# Patient Record
Sex: FEMALE | Race: WHITE | URBAN - METROPOLITAN AREA
[De-identification: names, ages, dates, MRNs, and addresses within clinical notes are randomized per-mention and may not be internally consistent; named-entity substitution may affect disease eponyms.]

---

## 2017-01-18 ENCOUNTER — OFFICE VISIT (OUTPATIENT)
Dept: PAIN MANAGEMENT | Age: 47
End: 2017-01-18

## 2017-01-18 VITALS
SYSTOLIC BLOOD PRESSURE: 154 MMHG | BODY MASS INDEX: 32.44 KG/M2 | WEIGHT: 201 LBS | DIASTOLIC BLOOD PRESSURE: 96 MMHG | HEART RATE: 97 BPM

## 2017-01-18 DIAGNOSIS — M76.891 ENTHESOPATHY OF HIP REGION ON BOTH SIDES: ICD-10-CM

## 2017-01-18 DIAGNOSIS — G89.4 CHRONIC PAIN SYNDROME: ICD-10-CM

## 2017-01-18 DIAGNOSIS — G43.719 INTRACTABLE CHRONIC MIGRAINE WITHOUT AURA AND WITHOUT STATUS MIGRAINOSUS: ICD-10-CM

## 2017-01-18 DIAGNOSIS — M76.892 ENTHESOPATHY OF HIP REGION ON BOTH SIDES: ICD-10-CM

## 2017-01-18 DIAGNOSIS — F41.9 ANXIETY AND DEPRESSION: ICD-10-CM

## 2017-01-18 DIAGNOSIS — K80.50 PANCREATITIS DUE TO COMMON BILE DUCT STONE: ICD-10-CM

## 2017-01-18 DIAGNOSIS — Z79.899 ENCOUNTER FOR LONG-TERM (CURRENT) USE OF HIGH-RISK MEDICATION: ICD-10-CM

## 2017-01-18 DIAGNOSIS — K85.90 PANCREATITIS DUE TO COMMON BILE DUCT STONE: ICD-10-CM

## 2017-01-18 DIAGNOSIS — D86.2 SARCOIDOSIS OF LUNG WITH SARCOIDOSIS OF LYMPH NODES (HCC): ICD-10-CM

## 2017-01-18 DIAGNOSIS — M79.18 MYOFASCIAL PAIN: ICD-10-CM

## 2017-01-18 DIAGNOSIS — M46.1 SACROILIITIS, NOT ELSEWHERE CLASSIFIED (HCC): ICD-10-CM

## 2017-01-18 DIAGNOSIS — M25.50 PAIN IN JOINT, MULTIPLE SITES: ICD-10-CM

## 2017-01-18 DIAGNOSIS — M54.59 MECHANICAL LOW BACK PAIN: Primary | ICD-10-CM

## 2017-01-18 DIAGNOSIS — R10.84 GENERALIZED ABDOMINAL PAIN: ICD-10-CM

## 2017-01-18 DIAGNOSIS — F32.A ANXIETY AND DEPRESSION: ICD-10-CM

## 2017-01-18 RX ORDER — METHYLPHENIDATE HYDROCHLORIDE 10 MG/1
TABLET ORAL
Qty: 90 TAB | Refills: 0 | Status: SHIPPED | OUTPATIENT
Start: 2017-02-26 | End: 2017-04-24 | Stop reason: SDUPTHER

## 2017-01-18 RX ORDER — OXYCODONE HYDROCHLORIDE 30 MG/1
30 TABLET ORAL
Qty: 120 TAB | Refills: 0 | Status: SHIPPED | OUTPATIENT
Start: 2017-02-26 | End: 2017-03-28

## 2017-01-18 RX ORDER — ROPINIROLE 0.5 MG/1
1 TABLET, FILM COATED ORAL 2 TIMES DAILY
Qty: 120 TAB | Refills: 11 | Status: SHIPPED | OUTPATIENT
Start: 2017-01-18 | End: 2017-10-02 | Stop reason: SDUPTHER

## 2017-01-18 RX ORDER — FROVATRIPTAN SUCCINATE 2.5 MG/1
TABLET, FILM COATED ORAL
Qty: 12 TAB | Refills: 11 | Status: SHIPPED | OUTPATIENT
Start: 2017-01-18

## 2017-01-18 RX ORDER — METHYLPHENIDATE HYDROCHLORIDE 10 MG/1
TABLET ORAL
Qty: 90 TAB | Refills: 0 | Status: SHIPPED | OUTPATIENT
Start: 2017-03-27

## 2017-01-18 RX ORDER — HYDROMORPHONE HYDROCHLORIDE 3 MG/1
3 SUPPOSITORY RECTAL
Qty: 12 SUPPOSITORY | Refills: 0 | Status: SHIPPED | OUTPATIENT
Start: 2017-02-16 | End: 2017-03-18

## 2017-01-18 RX ORDER — HYDROMORPHONE HYDROCHLORIDE 3 MG/1
3 SUPPOSITORY RECTAL
Qty: 12 SUPPOSITORY | Refills: 0 | Status: SHIPPED | OUTPATIENT
Start: 2017-03-17 | End: 2017-04-16

## 2017-01-18 RX ORDER — OXYCODONE HYDROCHLORIDE 30 MG/1
30 TABLET ORAL
Qty: 120 TAB | Refills: 0 | Status: SHIPPED | OUTPATIENT
Start: 2017-01-28 | End: 2017-02-27

## 2017-01-18 RX ORDER — OXYCODONE HYDROCHLORIDE 30 MG/1
30 TABLET ORAL
Qty: 120 TAB | Refills: 0 | Status: SHIPPED | OUTPATIENT
Start: 2017-03-27 | End: 2017-04-27 | Stop reason: SDUPTHER

## 2017-01-18 RX ORDER — HYDROMORPHONE HYDROCHLORIDE 3 MG/1
3 SUPPOSITORY RECTAL
Qty: 12 SUPPOSITORY | Refills: 0 | Status: SHIPPED | OUTPATIENT
Start: 2017-04-15 | End: 2017-05-15

## 2017-01-18 RX ORDER — METHYLPHENIDATE HYDROCHLORIDE 10 MG/1
TABLET ORAL
Qty: 90 TAB | Refills: 0 | Status: SHIPPED | OUTPATIENT
Start: 2017-01-28

## 2017-01-18 RX ORDER — DICYCLOMINE HYDROCHLORIDE 10 MG/1
10 CAPSULE ORAL
COMMUNITY

## 2017-01-18 NOTE — MR AVS SNAPSHOT
Visit Information Date & Time Provider Department Dept. Phone Encounter #  
 1/18/2017  3:40 PM Katelin Valdez MD [facility] [phone] Follow-up Instructions Return in about 3 months (around 4/18/2017). Upcoming Health Maintenance Date Due DTaP/Tdap/Td series (1 - Tdap) 3/13/1990 PAP AKA CERVICAL CYTOLOGY 3/30/2015 INFLUENZA AGE 9 TO ADULT 8/1/2016 Allergies as of 1/18/2017  Review Complete On: 1/18/2017 By: Nate Peraza RN Severity Noted Reaction Type Reactions Latex High 08/19/2013    Anaphylaxis Ambien [Zolpidem]   Intolerance Other (comments) Works too long/ not allergic Augmentin [Amoxicillin-pot Clavulanate]    Diarrhea NOT ALLERGIC Metformin    Other (comments) GI upset/ NOT ALLERGIC Zoloft [Sertraline]    Other (comments) Insomnia/ NOT ALLERGIC Current Immunizations  Never Reviewed No immunizations on file. Not reviewed this visit You Were Diagnosed With   
  
 Codes Comments Anxiety and depression     ICD-10-CM: F41.9, F32.9 ICD-9-CM: 300.00, 311 Vitals BP Pulse Weight(growth percentile) LMP BMI OB Status (!) 154/96 97 201 lb (91.2 kg) 12/19/2016 32.44 kg/m2 Having regular periods Smoking Status Never Smoker BMI and BSA Data Body Mass Index Body Surface Area  
 32.44 kg/m 2 2.06 m 2 Preferred Pharmacy Pharmacy Name Phone [facility] [address] [phone] Your Updated Medication List  
  
   
This list is accurate as of: 1/18/17  5:01 PM.  Always use your most recent med list.  
  
  
  
  
 budesonide-formoterol 160-4.5 mcg/actuation HFA inhaler Commonly known as:  SYMBICORT Take 2 Puffs by inhalation two (2) times a day. CREON 12,000-38,000 -60,000 unit capsule Generic drug:  amylase-lipase-protease Take  by mouth three (3) times daily (with meals). cyclobenzaprine 10 mg tablet Commonly known as:  FLEXERIL  
TAKE ONE TABLET BY MOUTH THREE TIMES DAILY WITH MEALS  
  
 dicyclomine 10 mg capsule Commonly known as:  BENTYL Take 10 mg by mouth 4 times daily (before meals and nightly). ergocalciferol (vitamin d2) 50,000 unit Tab Take  by mouth.  
  
 escitalopram oxalate 20 mg tablet Commonly known as:  Patricia Ramp TAKE ONE TABLET BY MOUTH ONE TIME DAILY  
  
 fluticasone 50 mcg/actuation nasal spray Commonly known as:  FLONASE  
1 spray in each nostril bid  
  
 frovatriptan 2.5 mg tablet Commonly known as:  Duy Camacho TAKE 1 TABLET BY MOUTH BEGINNING 5 DAYS PRIOR TO ONSET OF MENSES. STOP ON DAY 1, RESUME DAILY DOSE ON DAYS 5-10  Indications: MIGRAINE  
  
 * HYDROmorphone 3 mg suppository Commonly known as:  DILAUDID Insert 3 mg into rectum every six (6) hours as needed for Pain for up to 30 days. Max Daily Amount: 12 mg. Intractable migraine  Indications: PAIN Start taking on:  2/16/2017  
  
 * HYDROmorphone 3 mg suppository Commonly known as:  DILAUDID Insert 3 mg into rectum every six (6) hours as needed for Pain for up to 30 days. Max Daily Amount: 12 mg. Intractable migraine  Indications: PAIN Start taking on:  3/17/2017  
  
 * HYDROmorphone 3 mg suppository Commonly known as:  DILAUDID Insert 3 mg into rectum every six (6) hours as needed for Pain for up to 30 days. Max Daily Amount: 12 mg. Intractable migraine  Indications: PAIN Start taking on:  4/15/2017  
  
 methIMAzole 5 mg tablet Commonly known as:  TAPAZOLE Take  by mouth. * methylphenidate 10 mg tablet Commonly known as:  RITALIN For alertness--Take 1-2 in the am and 1 after lunchtime if needed. Indications: ATTENTION-DEFICIT HYPERACTIVITY DISORDER Start taking on:  1/28/2017 * methylphenidate 10 mg tablet Commonly known as:  RITALIN  
 For alertness--Take 1-2 in the am and 1 after lunchtime if needed. Indications: ATTENTION-DEFICIT HYPERACTIVITY DISORDER Start taking on:  2/26/2017 * methylphenidate 10 mg tablet Commonly known as:  RITALIN For alertness--Take 1-2 in the am and 1 after lunchtime if needed. Indications: ATTENTION-DEFICIT HYPERACTIVITY DISORDER Start taking on:  3/27/2017  
  
 mometasone 220 mcg (14 doses) inhaler Commonly known as:  Elenor Freddy Take  by inhalation daily. montelukast 10 mg tablet Commonly known as:  SINGULAIR Take 10 mg by mouth daily. naproxen  mg EC tablet Commonly known as:  NAPROSYN EC  
2 po to treat migraine headache Max 2/day  Indications: DYSMENORRHEA, PAIN  
  
 ondansetron 8 mg disintegrating tablet Commonly known as:  ZOFRAN ODT  
DISSOLVE ONE TABLET IN MOUTH AT ONSET OF MIGRAINE AND UP TO EVERY SIX HOURS AS NEEDED FOR NAUSEA  
  
 ONE-A-DAY WOMENS FORMULA PO Take  by mouth. * oxyCODONE IR 30 mg immediate release tablet Commonly known as:  Berny Nathalie Take 1 Tab by mouth four (4) times daily as needed for Pain for up to 30 days. Indications: NEUROPATHIC PAIN, PAIN Start taking on:  1/28/2017 * oxyCODONE IR 30 mg immediate release tablet Commonly known as:  Berny Nathalie Take 1 Tab by mouth four (4) times daily as needed for Pain for up to 30 days. Indications: NEUROPATHIC PAIN, PAIN Start taking on:  2/26/2017 * oxyCODONE IR 30 mg immediate release tablet Commonly known as:  OXY-IR Take 1 Tab by mouth four (4) times daily as needed for Pain for up to 30 days. Indications: NEUROPATHIC PAIN, PAIN Start taking on:  3/27/2017 * oxyMORphone 20 mg ER tablet Commonly known as:  OPANA ER Take 1 Tab by mouth every twelve (12) hours for 30 days. Max Daily Amount: 40 mg. Indications: CHRONIC PAIN, SEVERE PAIN Start taking on:  1/28/2017 * oxyMORphone 20 mg ER tablet Commonly known as:  OPANA ER  
 Take 1 Tab by mouth every twelve (12) hours for 30 days. Max Daily Amount: 40 mg. Indications: CHRONIC PAIN, SEVERE PAIN Start taking on:  2/26/2017 * oxyMORphone 20 mg ER tablet Commonly known as:  OPANA ER Take 1 Tab by mouth every twelve (12) hours for 30 days. Max Daily Amount: 40 mg. Indications: CHRONIC PAIN, SEVERE PAIN Start taking on:  3/27/2017 PriLOSEC 20 mg capsule Generic drug:  omeprazole Take 20 mg by mouth daily. * rOPINIRole 0.5 mg tablet Commonly known as:  Sapphire Schimke Take 2 Tabs by mouth two (2) times a day. * rOPINIRole 0.5 mg tablet Commonly known as:  Sapphire Schimke Take 2 Tabs by mouth two (2) times a day for 30 days. Indications: Restless Legs Syndrome SUMAtriptan 20 mg/actuation nasal spray Commonly known as:  IMITREX  
1 spray as directed for intractable headache. Do not use within 6 hours of frova SUMAtriptan Succinate 6 mg/0.5 mL Nfij Commonly known as:  Marcela Domingo Use prn severe headaches  Indications: MIGRAINE  
  
 topiramate 100 mg tablet Commonly known as:  TOPAMAX TAKE ONE TABLET BY MOUTH TWICE DAILY * Notice: This list has 14 medication(s) that are the same as other medications prescribed for you. Read the directions carefully, and ask your doctor or other care provider to review them with you. Prescriptions Printed Refills  
 methylphenidate (RITALIN) 10 mg tablet 0 Starting on: 3/27/2017 Sig: For alertness--Take 1-2 in the am and 1 after lunchtime if needed. Indications: ATTENTION-DEFICIT HYPERACTIVITY DISORDER Class: Print  
 oxyMORphone 20 mg PO ER tablet 0 Starting on: 3/27/2017 Sig: Take 1 Tab by mouth every twelve (12) hours for 30 days. Max Daily Amount: 40 mg. Indications: CHRONIC PAIN, SEVERE PAIN Class: Print Route: Oral  
 HYDROmorphone (DILAUDID) 3 mg suppository 0 Starting on: 4/15/2017  Sig: Insert 3 mg into rectum every six (6) hours as needed for Pain for up to 30 days. Max Daily Amount: 12 mg. Intractable migraine  Indications: PAIN Class: Print Route: Rectal  
 oxyCODONE IR (OXY-IR) 30 mg immediate release tablet 0 Starting on: 3/27/2017 Sig: Take 1 Tab by mouth four (4) times daily as needed for Pain for up to 30 days. Indications: NEUROPATHIC PAIN, PAIN Class: Print Route: Oral  
 oxyCODONE IR (ROXICODONE) 30 mg immediate release tablet 0 Starting on: 2/26/2017 Sig: Take 1 Tab by mouth four (4) times daily as needed for Pain for up to 30 days. Indications: NEUROPATHIC PAIN, PAIN Class: Print Route: Oral  
 oxyMORphone 20 mg PO ER tablet 0 Starting on: 2/26/2017 Sig: Take 1 Tab by mouth every twelve (12) hours for 30 days. Max Daily Amount: 40 mg. Indications: CHRONIC PAIN, SEVERE PAIN Class: Print Route: Oral  
 oxyMORphone 20 mg PO ER tablet 0 Starting on: 1/28/2017 Sig: Take 1 Tab by mouth every twelve (12) hours for 30 days. Max Daily Amount: 40 mg. Indications: CHRONIC PAIN, SEVERE PAIN Class: Print Route: Oral  
 HYDROmorphone (DILAUDID) 3 mg suppository 0 Starting on: 3/17/2017 Sig: Insert 3 mg into rectum every six (6) hours as needed for Pain for up to 30 days. Max Daily Amount: 12 mg. Intractable migraine  Indications: PAIN Class: Print Route: Rectal  
 HYDROmorphone (DILAUDID) 3 mg suppository 0 Starting on: 2/16/2017 Sig: Insert 3 mg into rectum every six (6) hours as needed for Pain for up to 30 days. Max Daily Amount: 12 mg. Intractable migraine  Indications: PAIN Class: Print Route: Rectal  
 oxyCODONE IR (ROXICODONE) 30 mg immediate release tablet 0 Starting on: 1/28/2017 Sig: Take 1 Tab by mouth four (4) times daily as needed for Pain for up to 30 days. Indications: NEUROPATHIC PAIN, PAIN Class: Print Route: Oral  
 methylphenidate (RITALIN) 10 mg tablet 0 Starting on: 2/26/2017 Sig: For alertness--Take 1-2 in the am and 1 after lunchtime if needed. Indications: ATTENTION-DEFICIT HYPERACTIVITY DISORDER Class: Print  
 methylphenidate (RITALIN) 10 mg tablet 0 Starting on: 1/28/2017 Sig: For alertness--Take 1-2 in the am and 1 after lunchtime if needed. Indications: ATTENTION-DEFICIT HYPERACTIVITY DISORDER Class: Print Prescriptions Sent to Pharmacy Refills  
 frovatriptan (FROVA) 2.5 mg tablet 11 Sig: TAKE 1 TABLET BY MOUTH BEGINNING 5 DAYS PRIOR TO ONSET OF MENSES. STOP ON DAY 1, RESUME DAILY DOSE ON DAYS 5-10  Indications: MIGRAINE Class: Normal  
 Pharmacy: [place] [address] [address] [facility] Ph #: [phone] rOPINIRole (REQUIP) 0.5 mg tablet 11 Sig: Take 2 Tabs by mouth two (2) times a day for 30 days. Indications: Restless Legs Syndrome Class: Normal  
 Pharmacy: [place] [address] Ph #: [phone] Route: Oral  
  
Follow-up Instructions Return in about 3 months (around 4/18/2017). Patient Instructions Current health maintenance issues were reviewed and the patient was advised to followup with his/her PCP for completion of these items. Introducing [facility]! [employer] introduces [employer] patient portal. Now you can access parts of your medical record, email your doctor's office, and request medication refills online. 1. In your internet browser, go to [email or web address]"[employer]. [place]/"[employer] 2. Click on the First Time User? Click Here link in the Sign In box. You will see the New Member Sign Up page. 3. Enter your [employer] Access Code exactly as it appears below. You will not need to use this code after youve completed the sign-up process. If you do not sign up before the expiration date, you must request a new code. · [employer] Access Code: [email or web address] Expires: 1/24/2017  3:42 PM 
 
4.  Enter the last four digits of your Social Security Number (xxxx) and Date of Birth (mm/dd/yyyy) as indicated and click Submit. You will be taken to the next sign-up page. 5. Create a [employer] ID. This will be your [employer] login ID and cannot be changed, so think of one that is secure and easy to remember. 6. Create a [employer] password. You can change your password at any time. 7. Enter your Password Reset Question and Answer. This can be used at a later time if you forget your password. 8. Enter your e-mail address. You will receive e-mail notification when new information is available in [address] 9. Click Sign Up. You can now view and download portions of your medical record. 10. Click the Download Summary menu link to download a portable copy of your medical information. If you have questions, please visit the Frequently Asked Questions section of the [employer] website. Remember, [employer] is NOT to be used for urgent needs. For medical emergencies, dial 911. Now available from your iPhone and Android! Please provide this summary of care documentation to your next provider. Your primary care clinician is listed as Pee Gutierrez. If you have any questions after today's visit, please call [phone].

## 2017-01-18 NOTE — PROGRESS NOTES
Nursing Notes    Patient presents to the office today in follow-up. Patient rates her pain at 5/10 on the numerical pain scale. Reviewed medications with counts as follows:    Rx Date filled Qty Dispensed Pill Count Last Dose Short   maynor 30 1/10/17 120 89 1/18/17 no   Oxymorphone Er 20 12/30/16 60 25 1/18/17 no   Ritalin 10 12/30/16 30 60 1/18/17 no       Comments:     POC UDS was not performed in office today    Any new labs or imaging since last appointment? YES, chest xrays and blood work with cultures    Have you been to an emergency room (ER) or urgent care clinic since your last visit? NO            Have you been hospitalized since your last visit? NO     If yes, where, when, and reason for visit? Have you seen or consulted any other health care providers outside of the [address] [place] [address]  since your last visit? YES   pulmonologist with pneumonia  If yes, where, when, and reason for visit? Ms. Cameron Brunner has a reminder for a \"due or due soon\" health maintenance. I have asked that she contact her primary care provider for follow-up on this health maintenance.

## 2017-01-19 NOTE — PROGRESS NOTES
HISTORY OF PRESENT ILLNESS  Bruno Pierson is a 51 y.o. female. HPI she returns for follow-up of chronic, severe pain which is widespread and multifactorial and includes chronic intractable migraine headaches, fibromyalgia, generalized osteoarthritis, mechanical low back pain, and sacroiliitis as well as generalized abdominal pain. Pain has been under good control, averaging 5 out of 10 with 80% overall relief. She reports only 3 bad headaches in the past 3 months. Pain level today 5 out of 10, outcome 11/28,(The lower the upper number, the better the outcome)  Physical activity mobility, mood, and sleep are all fair. No reported side effects. Review of Systems   Constitutional: Positive for weight loss. HENT: Positive for tinnitus. Eyes: Positive for photophobia. Wearing eyeglasses   Gastrointestinal: Positive for abdominal pain, constipation (uses stool softeners), diarrhea, heartburn, nausea and vomiting. Musculoskeletal: Positive for back pain, joint pain, myalgias and neck pain. Neurological: Positive for headaches. Endo/Heme/Allergies: Bruises/bleeds easily. All other systems reviewed and are negative. Physical Exam   Constitutional: She is oriented to person, place, and time. She appears well-developed and well-nourished. No distress. Overweight pleasant female in NAD   HENT:   Head: Normocephalic and atraumatic. Eyes: EOM are normal.   Neck: No thyromegaly present. Musculoskeletal:        Right shoulder: She exhibits tenderness. Left shoulder: She exhibits tenderness. Right hip: She exhibits tenderness. Left hip: She exhibits tenderness. Cervical back: She exhibits tenderness, pain and spasm. Lumbar back: She exhibits decreased range of motion, tenderness, pain and spasm. Right forearm: She exhibits tenderness. Left forearm: She exhibits tenderness. Right lower leg: She exhibits tenderness.         Left lower leg: She exhibits tenderness. Tightness, pain and spasm of cervical paraspinal muscles, extending upwards to the occiput and caudally, involving trapezial and scapular muscles bilaterally. Pain, tenderness and spasm with palpation of lumbar paraspinal muscles, and bilateral SIJs/trochanters. Neurological: She is alert and oriented to person, place, and time. Psychiatric: She has a normal mood and affect. Her behavior is normal. Judgment and thought content normal.   Nursing note and vitals reviewed. ASSESSMENT and PLAN  Encounter Diagnoses   Name Primary? 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 3 month reassess her    No concerns are raised for misuse, abuse, or diversion. 1. Pain medications are prescribed with the objective of pain relief and improved physical and psychosocial function in this patient. 2. Counseled patient on proper use of prescribed medications and reviewed opioid contract. 3. Counseled patient about chronic medical conditions and their relationship to anxiety and depression and recommended mental health support as needed. 4. Reviewed with patient self-help tools, home exercise, and lifestyle changes to assist the patient in self-management of symptoms.   5. Advised patient to have a primary care provider to continue care for health maintenance and general medical conditions and support for referral to specialty care as needed. 6. Reviewed with patient the treatment plan, goals of treatment plan, and limitations of treatment plan, to include the potential for side effects from medications and procedures. If side effects occur, it is the responsibility of the patient to inform the clinic so that a change in the treatment plan can be made in a safe manner. The patient is advised that stopping prescribed medication may cause an increase in symptoms and possible medication withdrawal symptoms. The patient is informed an emergency room evaluation may be necessary if this occurs. DISPOSITION: The patients condition and plan were discussed at length and all questions were answered. The patient agrees with the plan.     Counseling occupied > 50% of visit:  Total time: 40 minutes

## 2017-04-24 DIAGNOSIS — F32.A ANXIETY AND DEPRESSION: ICD-10-CM

## 2017-04-24 DIAGNOSIS — F41.9 ANXIETY AND DEPRESSION: ICD-10-CM

## 2017-04-24 RX ORDER — METHYLPHENIDATE HYDROCHLORIDE 10 MG/1
TABLET ORAL
Qty: 90 TAB | Refills: 0 | Status: SHIPPED | OUTPATIENT
Start: 2017-05-04

## 2017-04-24 NOTE — TELEPHONE ENCOUNTER
Patients appointment was rescheduled due to the provider out of office. Has rescheduled to 5/8/17. Requesting enough medications.       reviewed and noted last filled oxymorphone er 4/6/17, hydromorphone suppositories 4/12 and ritalin 4/6/17

## 2017-04-27 RX ORDER — OXYCODONE HYDROCHLORIDE 30 MG/1
30 TABLET ORAL
Qty: 120 TAB | Refills: 0 | Status: SHIPPED | OUTPATIENT
Start: 2017-05-05 | End: 2017-06-04

## 2017-05-08 NOTE — TELEPHONE ENCOUNTER
Pt late for her rescheduled appt and had to be rescheduled. Will need oxymorphone to last as last her rescheduled appt.  Pt advised via message of script readiness

## 2017-05-10 ENCOUNTER — TELEPHONE (OUTPATIENT)
Dept: PAIN MANAGEMENT | Age: 47
End: 2017-05-10

## 2017-05-10 NOTE — TELEPHONE ENCOUNTER
Submitted pa to [facility] for oxymorphone er 30mg - was denied - pt disenrolled as of 4/30/17. Called pt to see if she has new insurance. Pt said she is waiting for paperwork for [employer], so right now she does not have insurance. Pt asked if there is something cheaper she could get as she has to pay out of pocket for her medication and the opana er is very expensive. Told pt I will let Janice know - as she is the one who would have to make the decision as to what she can prescribe. Pt has about 5 days of meds left - she paid for 8 days of opana er. Pt understood.

## 2017-05-31 NOTE — TELEPHONE ENCOUNTER
Deborah Suarez PA-C   to Corey Persaud LPN           1:21 PM    Pt will have to be seen for any med changes. Please see if we have a spot open.  Thanks /kg

## 2017-05-31 NOTE — TELEPHONE ENCOUNTER
Received call from patient stating that she now has coverage and is requesting medications; please advise.

## 2017-06-27 ENCOUNTER — OFFICE VISIT (OUTPATIENT)
Dept: PAIN MANAGEMENT | Age: 47
End: 2017-06-27

## 2017-06-27 VITALS
DIASTOLIC BLOOD PRESSURE: 106 MMHG | HEART RATE: 114 BPM | WEIGHT: 201 LBS | BODY MASS INDEX: 32.44 KG/M2 | SYSTOLIC BLOOD PRESSURE: 151 MMHG

## 2017-06-27 DIAGNOSIS — G43.719 INTRACTABLE CHRONIC MIGRAINE WITHOUT AURA AND WITHOUT STATUS MIGRAINOSUS: Primary | ICD-10-CM

## 2017-06-27 DIAGNOSIS — Z79.899 ENCOUNTER FOR LONG-TERM (CURRENT) USE OF HIGH-RISK MEDICATION: ICD-10-CM

## 2017-06-27 DIAGNOSIS — M25.50 PAIN IN JOINT, MULTIPLE SITES: ICD-10-CM

## 2017-06-27 DIAGNOSIS — M46.1 SACROILIITIS, NOT ELSEWHERE CLASSIFIED (HCC): ICD-10-CM

## 2017-06-27 DIAGNOSIS — M54.59 MECHANICAL LOW BACK PAIN: ICD-10-CM

## 2017-06-27 RX ORDER — METHYLPHENIDATE HYDROCHLORIDE 10 MG/1
TABLET ORAL
Qty: 90 TAB | Refills: 0 | Status: SHIPPED | OUTPATIENT
Start: 2017-06-27

## 2017-06-27 RX ORDER — MORPHINE SULFATE 20 MG/1
CAPSULE, EXTENDED RELEASE ORAL
Qty: 50 CAP | Refills: 0 | Status: SHIPPED | OUTPATIENT
Start: 2017-06-27

## 2017-06-27 RX ORDER — METHYLPHENIDATE HYDROCHLORIDE 10 MG/1
TABLET ORAL
Qty: 90 TAB | Refills: 0 | Status: SHIPPED | OUTPATIENT
Start: 2017-07-25

## 2017-06-27 RX ORDER — MORPHINE SULFATE 20 MG/1
20 CAPSULE, EXTENDED RELEASE ORAL 2 TIMES DAILY
Qty: 60 CAP | Refills: 0 | Status: SHIPPED | OUTPATIENT
Start: 2017-07-25 | End: 2017-08-24

## 2017-06-27 NOTE — MR AVS SNAPSHOT
Visit Information Date & Time Provider Department Dept. Phone Encounter #  
 6/27/2017  3:20 PM Dony Saucedo MD [facility] [phone] [id number] Follow-up Instructions Return in about 2 months (around 8/27/2017). Upcoming Health Maintenance Date Due DTaP/Tdap/Td series (1 - Tdap) 3/13/1990 PAP AKA CERVICAL CYTOLOGY 3/30/2015 INFLUENZA AGE 9 TO ADULT 8/1/2017 Allergies as of 6/27/2017  Review Complete On: 6/27/2017 By: Aury Mobley RN Severity Noted Reaction Type Reactions Latex High 08/19/2013    Anaphylaxis Ambien [Zolpidem]   Intolerance Other (comments) Works too long/ not allergic Augmentin [Amoxicillin-pot Clavulanate]    Diarrhea NOT ALLERGIC Metformin    Other (comments) GI upset/ NOT ALLERGIC Zoloft [Sertraline]    Other (comments) Insomnia/ NOT ALLERGIC Current Immunizations  Never Reviewed No immunizations on file. Not reviewed this visit Vitals BP Pulse Weight(growth percentile) BMI OB Status Smoking Status (!) 151/106 (!) 114 201 lb (91.2 kg) 32.44 kg/m2 Having regular periods Never Smoker Vitals History BMI and BSA Data Body Mass Index Body Surface Area  
 32.44 kg/m 2 2.06 m 2 Preferred Pharmacy Pharmacy Name Phone [facility] [address] [phone] Your Updated Medication List  
  
   
This list is accurate as of: 6/27/17  4:29 PM.  Always use your most recent med list.  
  
  
  
  
 budesonide-formoterol 160-4.5 mcg/actuation HFA inhaler Commonly known as:  SYMBICORT Take 2 Puffs by inhalation two (2) times a day. CREON 12,000-38,000 -60,000 unit capsule Generic drug:  amylase-lipase-protease Take  by mouth three (3) times daily (with meals). cyclobenzaprine 10 mg tablet Commonly known as:  FLEXERIL  
 TAKE ONE TABLET BY MOUTH THREE TIMES DAILY WITH MEALS  
  
 dicyclomine 10 mg capsule Commonly known as:  BENTYL Take 10 mg by mouth 4 times daily (before meals and nightly). ergocalciferol (vitamin d2) 50,000 unit Tab Take  by mouth.  
  
 escitalopram oxalate 20 mg tablet Commonly known as:  Oletha Dubs TAKE ONE TABLET BY MOUTH ONE TIME DAILY  
  
 fluticasone 50 mcg/actuation nasal spray Commonly known as:  FLONASE  
1 spray in each nostril bid  
  
 frovatriptan 2.5 mg tablet Commonly known as:  Pecola Carol TAKE 1 TABLET BY MOUTH BEGINNING 5 DAYS PRIOR TO ONSET OF MENSES. STOP ON DAY 1, RESUME DAILY DOSE ON DAYS 5-10  Indications: MIGRAINE  
  
 methIMAzole 5 mg tablet Commonly known as:  TAPAZOLE Take  by mouth. * methylphenidate 10 mg tablet Commonly known as:  RITALIN For alertness--Take 1-2 in the am and 1 after lunchtime if needed. Indications: ATTENTION-DEFICIT HYPERACTIVITY DISORDER  
  
 * methylphenidate 10 mg tablet Commonly known as:  RITALIN For alertness--Take 1-2 in the am and 1 after lunchtime if needed. Indications: ATTENTION-DEFICIT HYPERACTIVITY DISORDER  
  
 * methylphenidate 10 mg tablet Commonly known as:  RITALIN For alertness--Take 1-2 in the am and 1 after lunchtime if needed. Indications: ATTENTION-DEFICIT HYPERACTIVITY DISORDER  
  
 * methylphenidate 10 mg tablet Commonly known as:  RITALIN Take 1-2 in the am and 1 after lunchtime if needed * methylphenidate 10 mg tablet Commonly known as:  RITALIN Take 1-2 in the am and 1 after lunchtime if needed Start taking on:  7/25/2017  
  
 mometasone 220 mcg (14 doses) inhaler Commonly known as:  Cheral Overall Take  by inhalation daily. montelukast 10 mg tablet Commonly known as:  SINGULAIR Take 10 mg by mouth daily. * Morphine 20 mg Cerp Commonly known as:  ED For chronic pain: 1 po q pm x 14 days then 1 po bid  Indications: Chronic Pain, Severe Pain * Morphine 20 mg Cerp Commonly known as:  ED Take 20 mg by mouth two (2) times a day for 30 days. Max Daily Amount: 40 mg. For chronic pain  Indications: Chronic Pain, Severe Pain Start taking on:  7/25/2017  
  
 naproxen  mg EC tablet Commonly known as:  NAPROSYN EC  
2 po to treat migraine headache Max 2/day  Indications: DYSMENORRHEA, PAIN  
  
 ondansetron 8 mg disintegrating tablet Commonly known as:  ZOFRAN ODT  
DISSOLVE ONE TABLET IN MOUTH AT ONSET OF MIGRAINE AND UP TO EVERY SIX HOURS AS NEEDED FOR NAUSEA  
  
 ONE-A-DAY WOMENS FORMULA PO Take  by mouth. PriLOSEC 20 mg capsule Generic drug:  omeprazole Take 20 mg by mouth daily. rOPINIRole 0.5 mg tablet Commonly known as:  Belita Leer Take 2 Tabs by mouth two (2) times a day. SUMAtriptan 20 mg/actuation nasal spray Commonly known as:  IMITREX  
1 spray as directed for intractable headache. Do not use within 6 hours of frova SUMAtriptan Succinate 6 mg/0.5 mL Nfij Commonly known as:  Paz Sample Use prn severe headaches  Indications: MIGRAINE  
  
 topiramate 100 mg tablet Commonly known as:  TOPAMAX TAKE ONE TABLET BY MOUTH TWICE DAILY * Notice: This list has 7 medication(s) that are the same as other medications prescribed for you. Read the directions carefully, and ask your doctor or other care provider to review them with you. Prescriptions Printed Refills Morphine (ED) 20 mg CERP 0 Starting on: 7/25/2017 Sig: Take 20 mg by mouth two (2) times a day for 30 days. Max Daily Amount: 40 mg. For chronic pain  Indications: Chronic Pain, Severe Pain Class: Print Route: Oral  
 Morphine (ED) 20 mg CERP 0 Sig: For chronic pain: 1 po q pm x 14 days then 1 po bid  Indications: Chronic Pain, Severe Pain Class: Print  
 methylphenidate (RITALIN) 10 mg tablet 0 Starting on: 7/25/2017 Sig: Take 1-2 in the am and 1 after lunchtime if needed Class: Print  
 methylphenidate (RITALIN) 10 mg tablet 0 Sig: Take 1-2 in the am and 1 after lunchtime if needed Class: Print Follow-up Instructions Return in about 2 months (around 8/27/2017). Patient Instructions Current health maintenance issues were reviewed and the patient was advised to followup with his/her PCP for completion of these items. Introducing \[facility] [place] [facility]\"" & HEALTH SERVICES! Dear Nellie Morales: Thank you for requesting a [facility] account. Our records indicate that you already have an active [facility] account. You can access your account anytime at [email or web address] Did you know that you can access your hospital and ER discharge instructions at any time in [employer]? You can also review all of your test results from your hospital stay or ER visit. Additional Information If you have questions, please visit the Frequently Asked Questions section of the [employer] website at [email or web address] Remember, [facility] is NOT to be used for urgent needs. For medical emergencies, dial 911. Now available from your iPhone and Android! Please provide this summary of care documentation to your next provider. Your primary care clinician is listed as Pee Gutierrez. If you have any questions after today's visit, please call [phone].

## 2017-06-27 NOTE — PROGRESS NOTES
Nursing Notes    Patient presents to the office today in follow-up. Patient rates her pain at 15/10 on the numerical pain scale. Comments: pt has been without meds since May, bridge script here dated to fill 5/8/17 but was not picked up. Script destroyed. POC UDS was performed in office today    Any new labs or imaging since last appointment? NO    Have you been to an emergency room (ER) or urgent care clinic since your last visit? NO            Have you been hospitalized since your last visit? NO     If yes, where, when, and reason for visit? Have you seen or consulted any other health care providers outside of the [facility]  since your last visit? NO     If yes, where, when, and reason for visit? Ms. Belgica Pool has a reminder for a \"due or due soon\" health maintenance. I have asked that she contact her primary care provider for follow-up on this health maintenance.

## 2017-06-28 LAB
ALCOHOL UR POC: NORMAL
AMPHETAMINES UR POC: NEGATIVE
BARBITURATES UR POC: NEGATIVE
BENZODIAZEPINES UR POC: NEGATIVE
BUPRENORPHINE UR POC: NORMAL
CANNABINOIDS UR POC: NEGATIVE
CARISOPRODOL UR POC: NORMAL
COCAINE UR POC: NEGATIVE
FENTANYL UR POC: NORMAL
MDMA/ECSTASY UR POC: NEGATIVE
METHADONE UR POC: NEGATIVE
METHAMPHETAMINE UR POC: NEGATIVE
METHYLPHENIDATE UR POC: NEGATIVE
OPIATES UR POC: NEGATIVE
OXYCODONE UR POC: NEGATIVE
PHENCYCLIDINE UR POC: NEGATIVE
PROPOXYPHENE UR POC: NORMAL
TRAMADOL UR POC: NORMAL
TRICYCLICS UR POC: NEGATIVE

## 2017-06-28 NOTE — PROGRESS NOTES
HISTORY OF PRESENT ILLNESS  Michelle Dee is a 49 y.o. female. HPI she returns for follow-up of chronic, severe pain which is widespread and multifactorial and includes chronic intractable migraine headaches, fibromyalgia, generalized osteoarthritis, mechanical low back pain, and sacroiliitis as well as generalized abdominal pain. As a result of scheduling difficulties, the patient had to be without her medications for approximately 1 month. She does not wish to return to the same level of medication as previously. Her medication history was reviewed and it was elected to initiate Mahnaz, 20 mg once daily which may be increased to twice daily after 2 weeks for long-acting pain control. Pain level today 5 out of 10, outcome 14/28,(The lower the upper number, the better the outcome)  Physical activity and mobility, mood, and sleep are all poor. No reported side effects. A current review of the  does not identify any inconsistency. UDS obtained and reviewed; formal confirmation from laboratory is pending. Review of Systems   Constitutional: Positive for weight loss. HENT: Positive for tinnitus. Eyes: Positive for photophobia. Wearing eyeglasses   Gastrointestinal: Positive for abdominal pain, constipation (uses stool softeners), diarrhea, heartburn, nausea and vomiting. Musculoskeletal: Positive for back pain, joint pain, myalgias and neck pain. Neurological: Positive for headaches. Endo/Heme/Allergies: Bruises/bleeds easily. All other systems reviewed and are negative. Physical Exam   Constitutional: She is oriented to person, place, and time. She appears well-developed and well-nourished. No distress. Overweight pleasant female in NAD   HENT:   Head: Normocephalic and atraumatic. Eyes: EOM are normal.   Neck: No thyromegaly present. Musculoskeletal:        Right shoulder: She exhibits tenderness. Left shoulder: She exhibits tenderness.         Right hip: She exhibits tenderness. Left hip: She exhibits tenderness. Cervical back: She exhibits tenderness, pain and spasm. Lumbar back: She exhibits decreased range of motion, tenderness, pain and spasm. Right forearm: She exhibits tenderness. Left forearm: She exhibits tenderness. Right lower leg: She exhibits tenderness. Left lower leg: She exhibits tenderness. Tightness, pain and spasm of cervical paraspinal muscles, extending upwards to the occiput and caudally, involving trapezial and scapular muscles bilaterally. Pain, tenderness and spasm with palpation of lumbar paraspinal muscles, and bilateral SIJs/trochanters. Neurological: She is alert and oriented to person, place, and time. Psychiatric: She has a normal mood and affect. Her behavior is normal. Judgment and thought content normal.   Nursing note and vitals reviewed. ASSESSMENT and PLAN  Encounter Diagnoses   Name Primary?  Intractable chronic migraine without aura and without status migrainosus Yes    Mechanical low back pain     Encounter for long-term (current) use of high-risk medication     Sacroiliitis, not elsewhere classified ([facility] [address]     Pain in joint, multiple sites      Treatment plan as noted above. 2 month reassess her    No concerns are raised for misuse, abuse, or diversion. 1. Pain medications are prescribed with the objective of pain relief and improved physical and psychosocial function in this patient. 2. Counseled patient on proper use of prescribed medications and reviewed opioid contract. 3. Counseled patient about chronic medical conditions and their relationship to anxiety and depression and recommended mental health support as needed. 4. Reviewed with patient self-help tools, home exercise, and lifestyle changes to assist the patient in self-management of symptoms.   5. Advised patient to have a primary care provider to continue care for health maintenance and general medical conditions and support for referral to specialty care as needed. 6. Reviewed with patient the treatment plan, goals of treatment plan, and limitations of treatment plan, to include the potential for side effects from medications and procedures. If side effects occur, it is the responsibility of the patient to inform the clinic so that a change in the treatment plan can be made in a safe manner. The patient is advised that stopping prescribed medication may cause an increase in symptoms and possible medication withdrawal symptoms. The patient is informed an emergency room evaluation may be necessary if this occurs. DISPOSITION: The patients condition and plan were discussed at length and all questions were answered. The patient agrees with the plan.     Counseling occupied > 50% of visit:  Total time: 40 minutes

## 2017-10-02 ENCOUNTER — OFFICE VISIT (OUTPATIENT)
Dept: PAIN MANAGEMENT | Age: 47
End: 2017-10-02

## 2017-10-02 VITALS
HEART RATE: 93 BPM | DIASTOLIC BLOOD PRESSURE: 102 MMHG | WEIGHT: 228 LBS | SYSTOLIC BLOOD PRESSURE: 168 MMHG | HEIGHT: 66 IN | BODY MASS INDEX: 36.64 KG/M2 | TEMPERATURE: 98.3 F | RESPIRATION RATE: 19 BRPM

## 2017-10-02 DIAGNOSIS — R41.3 MEMORY DIFFICULTY: ICD-10-CM

## 2017-10-02 DIAGNOSIS — Z79.899 ENCOUNTER FOR LONG-TERM (CURRENT) USE OF HIGH-RISK MEDICATION: ICD-10-CM

## 2017-10-02 DIAGNOSIS — G43.719 INTRACTABLE CHRONIC MIGRAINE WITHOUT AURA AND WITHOUT STATUS MIGRAINOSUS: Primary | ICD-10-CM

## 2017-10-02 DIAGNOSIS — F41.9 ANXIETY AND DEPRESSION: ICD-10-CM

## 2017-10-02 DIAGNOSIS — G43.019 INTRACTABLE MIGRAINE WITHOUT AURA AND WITHOUT STATUS MIGRAINOSUS: ICD-10-CM

## 2017-10-02 DIAGNOSIS — G43.839 INTRACTABLE MENSTRUAL MIGRAINE WITHOUT STATUS MIGRAINOSUS: ICD-10-CM

## 2017-10-02 DIAGNOSIS — M79.18 MYOFASCIAL PAIN: ICD-10-CM

## 2017-10-02 DIAGNOSIS — G89.4 CHRONIC PAIN SYNDROME: ICD-10-CM

## 2017-10-02 DIAGNOSIS — F32.A ANXIETY AND DEPRESSION: ICD-10-CM

## 2017-10-02 DIAGNOSIS — R10.84 GENERALIZED ABDOMINAL PAIN: ICD-10-CM

## 2017-10-02 RX ORDER — METHYLPHENIDATE HYDROCHLORIDE 10 MG/1
TABLET ORAL
Qty: 90 TAB | Refills: 0 | Status: SHIPPED | OUTPATIENT
Start: 2017-10-02

## 2017-10-02 RX ORDER — ROPINIROLE 0.5 MG/1
1 TABLET, FILM COATED ORAL
Qty: 60 TAB | Refills: 1 | Status: SHIPPED | OUTPATIENT
Start: 2017-10-02 | End: 2017-11-01

## 2017-10-02 RX ORDER — ONDANSETRON 8 MG/1
TABLET, ORALLY DISINTEGRATING ORAL
Qty: 60 TAB | Refills: 1 | Status: SHIPPED | OUTPATIENT
Start: 2017-10-02

## 2017-10-02 RX ORDER — METHYLPHENIDATE HYDROCHLORIDE 10 MG/1
TABLET ORAL
Qty: 90 TAB | Refills: 0 | Status: SHIPPED | OUTPATIENT
Start: 2017-11-01

## 2017-10-02 RX ORDER — CYCLOBENZAPRINE HCL 10 MG
10 TABLET ORAL
Qty: 90 TAB | Refills: 1 | Status: SHIPPED | OUTPATIENT
Start: 2017-10-02 | End: 2017-11-01

## 2017-10-02 NOTE — PROGRESS NOTES
Nursing Notes    Patient presents to the office today in follow-up. Patient rates her pain at 6/10 on the numerical pain scale. Reviewed medications with counts as follows:    Rx Date filled Qty Dispensed Pill Count Last Dose Short   Morphine sulfate ER 20 mg                                            POC UDS was not performed in office today    Any new labs or imaging since last appointment? NO    Have you been to an emergency room (ER) or urgent care clinic since your last visit? NO            Have you been hospitalized since your last visit? NO     If yes, where, when, and reason for visit? Have you seen or consulted any other health care providers outside of the [address] [place] [address]  since your last visit? NO     If yes, where, when, and reason for visit? HM deferred to pcp.

## 2017-10-02 NOTE — PROGRESS NOTES
HISTORY OF PRESENT ILLNESS  Shirley Moya is a 49 y.o. female    HPI: Ms. Zenon Alfaro returns for follow-up of chronic intractable migraine headaches, fibromyalgia, generalized abdominal pain (chronic pancreatitis) and generalized osteoarthritis. This is my first visit with this patient. She reports that she has been off her opioid medication Mahnaz 20 mg, for some time now.  indicates morphine ER 20 mg was last filled July 5, 2017. She states that she has been, \"in and out of withdrawals many times in the past \". She does not want to refill the Mahnaz 20 mg at this time. She reports that this medication does \"nothing for my pain\". Ms. Zenon Alfaro reports that she lost her insurance and will not have new insurance until January 1, 2018. She does work as a . We discussed going over possible new medications today however this patient reports she is late for a training class and does not have time to discuss any new medications today. She is not sure which medications she will be able to afford until then. I informed her that Dr. Marimar Plaza is no longer working as a provider in this pain management practice. We discussed that this change will involve adjustments to currently prescribed pain medications. This patient is reminded that she has the option to transfer to another pain management clinic if desired. We will provide a neurology referral today to continue migraine headache management if needed. A two month prescription bridge of her current migraine medication will be provided today for this patient in order to help her transition to a new provider for her migraine treatment. She understands and verbally agrees. She tolerates her medications without side effects. Shirley Moya reports no change in sleep or constipation. Medications are helping with pain control and quality of life. Her pain is 7 /10 with medication and 10/10 without.   Pt describes pain as, Yanet Heater I have a bad flu\":  Aggravating factors include standing, sitting, and walking. Relieved with rest, medication, and avoiding painful activities. The patient reports 60% relief with current medications. Current treatment is helping to improve general activity, mood, walking, sleep, enjoyment of life    Measuring clinical outcomes of chronic pain patients: score 13/28; the lower the number the better the outcome. Pain Meds and Quality Of Life have been reviewed. Nonpharmacologic therapy and non-opioid pharmacologic therapy were considered. If opioid therapy is prescribed, this is only if the expected benefits are anticipated to outweigh risks. She  is otherwise doing well with no other complaints today. She denies any adverse events including nausea, vomiting, dizziness, increased constipation, hallucinations, or seizures. The patient reports functional improvement and QOL with pain medication. Vitals:    10/02/17 0825   BP: (!) 168/102   Pulse: 93   Resp: 19   Temp: 98.3 °F (36.8 °C)   Weight: 103.4 kg (228 lb)   Height: 5' 6\" (1.676 m)   PainSc:   6   PainLoc: Abdomen         Allergies   Allergen Reactions    Latex Anaphylaxis    Ambien [Zolpidem] Other (comments)     Works too long/ not allergic    Augmentin [Amoxicillin-Pot Clavulanate] Diarrhea     NOT ALLERGIC    Metformin Other (comments)     GI upset/ NOT ALLERGIC    Zoloft [Sertraline] Other (comments)     Insomnia/ NOT ALLERGIC       Past Surgical History:   Procedure Laterality Date    ENDOSCOPY, COLON, DIAGNOSTIC  [id number]    Dr. Alyssa Willingham    HX CHOLECYSTECTOMY      HX OTHER SURGICAL      anal fissure/abcess/recurrent Dr. Millie Orosco   Constitutional: Positive for weight loss. HENT: Positive for tinnitus. Eyes: Positive for photophobia. Wearing eyeglasses   Gastrointestinal: Positive for abdominal pain, constipation (uses stool softeners), diarrhea, heartburn, nausea and vomiting. Musculoskeletal: Positive for back pain, joint pain, myalgias and neck pain. Neurological: Positive for headaches. Endo/Heme/Allergies: Bruises/bleeds easily. All other systems reviewed and are negative.     Physical Exam   Constitutional: She is oriented to person, place, and time and well-developed, well-nourished, and in no distress. She appears healthy. Non-toxic appearance. No distress. HENT:   Head: Normocephalic. Right Ear: External ear normal.   Left Ear: External ear normal.   Nose: Nose normal.   Eyes: Right eye exhibits no discharge. Left eye exhibits no discharge. No scleral icterus. Neck: Neck supple. Pulmonary/Chest: Effort normal.   Musculoskeletal: She exhibits tenderness. Neurological: She is alert and oriented to person, place, and time. Skin: Skin is warm and dry. She is not diaphoretic. Psychiatric: Her mood appears anxious. She exhibits a depressed mood. She expresses no suicidal ideation. Nursing note and vitals reviewed. ASSESSMENT:    1. Intractable chronic migraine without aura and without status migrainosus    2. Intractable menstrual migraine without status migrainosus    3. Intractable migraine without aura and without status migrainosus    4. Generalized abdominal pain    5. Chronic pain syndrome    6. Anxiety and depression    7. Myofascial pain    8. Memory difficulty    9. Encounter for long-term (current) use of high-risk medication          [place] Prescription Monitoring Program was reviewed which does not demonstrate aberrancies and/or inconsistencies with regard to the historical prescribing of controlled medications to this patient by other providers. Medications were not brought to visit today. When possible, non-drug therapy for chronic pain should be used as a first-line treatment.  Physical therapy exercise regimens, chiropractic manipulation, meditation relaxation techniques, cognitive behavior therapy, acupuncture, yoga, Abbe Chi, transcutaneous electrical nerve stimulation (TENS), and application of moist heat can help alleviate pain . Explained that realistic expectations and goals with chronic pain management are to maximize function and minimize pain with the understanding that limitations will exist both in the extent of relief that she may achieve, as well as thresholds of mg strengths that we will not exceed. Our role is to help the patient better cope with chronic pain utilizng a multimodal approach. The patients condition and plan were discussed. All questions were answered. The patient agrees with the plan. PLAN / Pt Instructions:   1. Stable on current medication without adverse events. 2. Refill current medications to provide time for transition to new provider for migraines  3. Referral for Neurology  4. Discussed risks of addiction, dependency, and opioid induced hyperalgesia. 5. Reviewed with patient benefits of home exercise, and lifestyle changes to assist in self-management of symptoms. 6. No follow up required unless needed per patient request       Prescription monitoring program reviewed. Pain medications prescribed with the objective of pain relief and improved physical and psychosocial function in this patient. DISPOSITION  · Counseled patient on proper use of prescribed medications. · Counseled patient about chronic medical conditions and their relationship to anxiety and depression and recommended mental health support as needed. · Reviewed with patient self-help tools, home exercise, and lifestyle changes to assist the patient in self-management of symptoms. · Reviewed with patient the treatment plan, goals of treatment plan, and limitations of treatment plan, to include the potential for side effects from medications and procedures. If side effects occur, it is the responsibility of the patient to inform the clinic so that a change in the treatment plan can be made in a safe manner.  The patient is advised that stopping prescribed medication may cause an increase in symptoms and possible medication withdrawal symptoms. The patient is informed an emergency room evaluation may be necessary if this occurs. Spent 25 minutes with patient today reviewing the treatment plan, goals of treatment plan, and limitations of the treatment plan, to include the potential for side effects from medications and procedures. Loyda Ashley PA-C 10/2/2017        Note: Please excuse any typographical errors. Voice recognition software was used for this note and may cause mistakes.

## 2017-10-02 NOTE — PATIENT INSTRUCTIONS
PLAN / Pt Instructions:  1. Continue current plan with no evidence of addiction or diversion. 2. Stable on current medication without adverse events. 3. Refill current migraine medications to provide time for transition to new provider for migraines  4. Referral for Neurology  5. Discussed risks of addiction, dependency, and opioid induced hyperalgesia. 6. Reviewed with patient benefits of home exercise, and lifestyle changes to assist in self-management of symptoms.   7. No follow up required unless needed per patient request

## 2017-10-02 NOTE — MR AVS SNAPSHOT
Visit Information Date & Time Provider Department Dept. Phone Encounter #  
 10/2/2017  8:20 AM [facility]  Upcoming Health Maintenance Date Due DTaP/Tdap/Td series (1 - Tdap) 3/13/1990 PAP AKA CERVICAL CYTOLOGY 3/30/2015 INFLUENZA AGE 9 TO ADULT 8/1/2017 Allergies as of 10/2/2017  Review Complete On: 10/2/2017 By: Loyda Ashley PA-C Severity Noted Reaction Type Reactions Latex High 08/19/2013    Anaphylaxis Ambien [Zolpidem]   Intolerance Other (comments) Works too long/ not allergic Augmentin [Amoxicillin-pot Clavulanate]    Diarrhea NOT ALLERGIC Metformin    Other (comments) GI upset/ NOT ALLERGIC Zoloft [Sertraline]    Other (comments) Insomnia/ NOT ALLERGIC Current Immunizations  Never Reviewed No immunizations on file. Not reviewed this visit You Were Diagnosed With   
  
 Codes Comments Intractable chronic migraine without aura and without status migrainosus    -  Primary ICD-10-CM: K32.460 ICD-9-CM: 346.71 Anxiety and depression     ICD-10-CM: F41.8 ICD-9-CM: 300.00, 311 Intractable menstrual migraine without status migrainosus     ICD-10-CM: C52.429 ICD-9-CM: 346.41 Intractable migraine without aura and without status migrainosus     ICD-10-CM: G43.019 
ICD-9-CM: 346.11 Chronic pain syndrome     ICD-10-CM: G89.4 ICD-9-CM: 338.4 Generalized abdominal pain     ICD-10-CM: R10.84 ICD-9-CM: 789.07 Vitals BP Pulse Temp Resp Height(growth percentile) Weight(growth percentile) (!) 168/102 93 98.3 °F (36.8 °C) 19 5' 6\" (1.676 m) 228 lb (103.4 kg) BMI OB Status Smoking Status 36.8 kg/m2 Having regular periods Never Smoker Vitals History BMI and BSA Data Body Mass Index Body Surface Area  
 36.8 kg/m 2 2.19 m 2 Preferred Pharmacy Pharmacy Name Phone [facility] [id number] [phone] Your Updated Medication List  
  
   
This list is accurate as of: 10/2/17  9:16 AM.  Always use your most recent med list.  
  
  
  
  
 budesonide-formoterol 160-4.5 mcg/actuation Hfaa Commonly known as:  SYMBICORT Take 2 Puffs by inhalation two (2) times a day. CREON 12,000-38,000 -60,000 unit capsule Generic drug:  lipase-protease-amylase Take  by mouth three (3) times daily (with meals). * cyclobenzaprine 10 mg tablet Commonly known as:  FLEXERIL  
TAKE ONE TABLET BY MOUTH THREE TIMES DAILY WITH MEALS * cyclobenzaprine 10 mg tablet Commonly known as:  FLEXERIL Take 1 Tab by mouth three (3) times daily (with meals) for 30 days. dicyclomine 10 mg capsule Commonly known as:  BENTYL Take 10 mg by mouth 4 times daily (before meals and nightly). ergocalciferol (vitamin d2) 50,000 unit Tab Take  by mouth.  
  
 escitalopram oxalate 20 mg tablet Commonly known as:  Wyn Mary TAKE ONE TABLET BY MOUTH ONE TIME DAILY  
  
 fluticasone 50 mcg/actuation nasal spray Commonly known as:  FLONASE  
1 spray in each nostril bid  
  
 frovatriptan 2.5 mg tablet Commonly known as:  Candance Heinrich TAKE 1 TABLET BY MOUTH BEGINNING 5 DAYS PRIOR TO ONSET OF MENSES. STOP ON DAY 1, RESUME DAILY DOSE ON DAYS 5-10  Indications: MIGRAINE  
  
 methIMAzole 5 mg tablet Commonly known as:  TAPAZOLE Take  by mouth. * methylphenidate HCl 10 mg tablet Commonly known as:  RITALIN For alertness--Take 1-2 in the am and 1 after lunchtime if needed. Indications: ATTENTION-DEFICIT HYPERACTIVITY DISORDER  
  
 * methylphenidate HCl 10 mg tablet Commonly known as:  RITALIN For alertness--Take 1-2 in the am and 1 after lunchtime if needed. Indications: ATTENTION-DEFICIT HYPERACTIVITY DISORDER  
  
 * methylphenidate HCl 10 mg tablet Commonly known as:  RITALIN  
 For alertness--Take 1-2 in the am and 1 after lunchtime if needed. Indications: ATTENTION-DEFICIT HYPERACTIVITY DISORDER  
  
 * methylphenidate HCl 10 mg tablet Commonly known as:  RITALIN Take 1-2 in the am and 1 after lunchtime if needed * methylphenidate HCl 10 mg tablet Commonly known as:  RITALIN Take 1-2 in the am and 1 after lunchtime if needed * methylphenidate HCl 10 mg tablet Commonly known as:  RITALIN For alertness--Take 1-2 in the am and 1 after lunchtime if needed. Indications: ATTENTION-DEFICIT HYPERACTIVITY DISORDER  
  
 * methylphenidate HCl 10 mg tablet Commonly known as:  RITALIN For alertness--Take 1-2 in the am and 1 after lunchtime if needed. Indications: ATTENTION-DEFICIT HYPERACTIVITY DISORDER Start taking on:  11/1/2017  
  
 mometasone 220 mcg (14 doses) inhaler Commonly known as:  Lormain Kellyman Take  by inhalation daily. montelukast 10 mg tablet Commonly known as:  SINGULAIR Take 10 mg by mouth daily. Morphine 20 mg Cerp Commonly known as:  ED For chronic pain: 1 po q pm x 14 days then 1 po bid  Indications: Chronic Pain, Severe Pain  
  
 naproxen  mg EC tablet Commonly known as:  NAPROSYN EC  
2 po to treat migraine headache Max 2/day  Indications: DYSMENORRHEA, PAIN  
  
 * ondansetron 8 mg disintegrating tablet Commonly known as:  ZOFRAN ODT  
DISSOLVE ONE TABLET IN MOUTH AT ONSET OF MIGRAINE AND UP TO EVERY SIX HOURS AS NEEDED FOR NAUSEA  
  
 * ondansetron 8 mg disintegrating tablet Commonly known as:  ZOFRAN ODT  
1 po at onset of migraine and up to q6h prn nausea ONE-A-DAY WOMENS FORMULA PO Take  by mouth. PriLOSEC 20 mg capsule Generic drug:  omeprazole Take 20 mg by mouth daily. * rOPINIRole 0.5 mg tablet Commonly known as:  Lincoln Peraza Take 2 Tabs by mouth two (2) times a day. * rOPINIRole 0.5 mg tablet Commonly known as:  Lincoln Peraza  
 Take 2 Tabs by mouth two (2) times daily as needed for up to 30 days. Indications: Restless Legs Syndrome SUMAtriptan 20 mg/actuation nasal spray Commonly known as:  IMITREX  
1 spray as directed for intractable headache. Do not use within 6 hours of frova * SUMAtriptan Succinate 6 mg/0.5 mL Nfij Commonly known as:  Prudence Dalal Use prn severe headaches  Indications: MIGRAINE  
  
 * SUMAtriptan Succinate 6 mg/0.5 mL Nfij Commonly known as:  Prudence Mulugeta Use prn severe headaches  Indications: MIGRAINE  
  
 topiramate 100 mg tablet Commonly known as:  TOPAMAX TAKE ONE TABLET BY MOUTH TWICE DAILY * Notice: This list has 15 medication(s) that are the same as other medications prescribed for you. Read the directions carefully, and ask your doctor or other care provider to review them with you. Prescriptions Printed Refills  
 methylphenidate HCl (RITALIN) 10 mg tablet 0 Starting on: 2017 Sig: For alertness--Take 1-2 in the am and 1 after lunchtime if needed. Indications: ATTENTION-DEFICIT HYPERACTIVITY DISORDER Class: Print  
 methylphenidate HCl (RITALIN) 10 mg tablet 0 Sig: For alertness--Take 1-2 in the am and 1 after lunchtime if needed. Indications: ATTENTION-DEFICIT HYPERACTIVITY DISORDER Class: Print Prescriptions Sent to Pharmacy Refills SUMAtriptan Succinate (SUMAVEL DOSEPRO) 6 mg/0.5 mL nfIj 1 Sig: Use prn severe headaches  Indications: MIGRAINE Class: Normal  
 Pharmacy: [address] Ph #: [phone]  
 ondansetron (ZOFRAN ODT) 8 mg disintegrating tablet 1 Si po at onset of migraine and up to q6h prn nausea Class: Normal  
 Pharmacy: [address] Ph #: [phone] rOPINIRole (REQUIP) 0.5 mg tablet 1  Sig: Take 2 Tabs by mouth two (2) times daily as needed for up to 30 days. Indications: Restless Legs Syndrome Class: Normal  
 Pharmacy: [address] Ph #: [phone] Route: Oral  
 cyclobenzaprine (FLEXERIL) 10 mg tablet 1 Sig: Take 1 Tab by mouth three (3) times daily (with meals) for 30 days. Class: Normal  
 Pharmacy: [address] Ph #: [phone] Route: Oral  
  
We Performed the Following REFERRAL TO NEUROLOGY [TGI21 Custom] Comments:  
 Please evaluate patient for intractable menstrual migraines Referral Information Referral ID Referred By Referred To  
  
 [id number] Freda Gonzales Not Available Visits Status Start Date End Date 1 New Request 10/2/17 10/2/18 If your referral has a status of pending review or denied, additional information will be sent to support the outcome of this decision. Patient Instructions No follow up required unless needed per patient request 
 
 
  
Introducing [facility]! Dear Dianna Brown: Thank you for requesting a [employer] account. Our records indicate that you already have an active [employer] account. You can access your account anytime at [email or web address] Did you know that you can access your hospital and ER discharge instructions at any time in [employer]? You can also review all of your test results from your hospital stay or ER visit. Additional Information If you have questions, please visit the Frequently Asked Questions section of the [employer] website at [email or web address] Remember, [employer] is NOT to be used for urgent needs. For medical emergencies, dial 911. Now available from your iPhone and Android! Please provide this summary of care documentation to your next provider. Your primary care clinician is listed as Vernell Petersen. If you have any questions after today's visit, please call [phone].

## 2017-10-02 NOTE — ACP (ADVANCE CARE PLANNING)
The pt does not have an advanced medical directive, POA, or living will. She is not interested in discussing this today.